# Patient Record
Sex: FEMALE | Race: WHITE | NOT HISPANIC OR LATINO | URBAN - METROPOLITAN AREA
[De-identification: names, ages, dates, MRNs, and addresses within clinical notes are randomized per-mention and may not be internally consistent; named-entity substitution may affect disease eponyms.]

---

## 2023-06-30 ENCOUNTER — APPOINTMENT (OUTPATIENT)
Dept: PRIMARY CARE | Facility: CLINIC | Age: 68
End: 2023-06-30
Payer: COMMERCIAL

## 2023-07-28 ENCOUNTER — APPOINTMENT (OUTPATIENT)
Dept: PRIMARY CARE | Facility: CLINIC | Age: 68
End: 2023-07-28
Payer: COMMERCIAL

## 2023-11-09 PROBLEM — S68.119A FINGER AMPUTATION, TRAUMATIC: Status: ACTIVE | Noted: 2023-11-09

## 2023-11-09 PROBLEM — S92.325A CLOSED NONDISPLACED FRACTURE OF SECOND METATARSAL BONE OF LEFT FOOT: Status: ACTIVE | Noted: 2023-11-09

## 2023-11-09 PROBLEM — H31.019: Status: ACTIVE | Noted: 2023-11-09

## 2023-11-09 PROBLEM — S92.215D: Status: ACTIVE | Noted: 2023-11-09

## 2023-11-09 RX ORDER — TRAMADOL HYDROCHLORIDE 50 MG/1
1 TABLET ORAL 3 TIMES DAILY PRN
COMMUNITY
Start: 2021-05-20

## 2023-11-10 ENCOUNTER — OFFICE VISIT (OUTPATIENT)
Dept: OPHTHALMOLOGY | Facility: CLINIC | Age: 68
End: 2023-11-10
Payer: COMMERCIAL

## 2023-11-10 DIAGNOSIS — H35.3221 EXUDATIVE AGE-RELATED MACULAR DEGENERATION OF LEFT EYE WITH ACTIVE CHOROIDAL NEOVASCULARIZATION (MULTI): ICD-10-CM

## 2023-11-10 DIAGNOSIS — H35.3231 EXUDATIVE AGE-RELATED MACULAR DEGENERATION OF BOTH EYES WITH ACTIVE CHOROIDAL NEOVASCULARIZATION (MULTI): Primary | ICD-10-CM

## 2023-11-10 PROCEDURE — 99214 OFFICE O/P EST MOD 30 MIN: CPT | Performed by: OPHTHALMOLOGY

## 2023-11-10 PROCEDURE — 92134 CPTRZ OPH DX IMG PST SGM RTA: CPT | Mod: BILATERAL PROCEDURE | Performed by: OPHTHALMOLOGY

## 2023-11-10 PROCEDURE — 67028 INJECTION EYE DRUG: CPT | Mod: LEFT SIDE | Performed by: OPHTHALMOLOGY

## 2023-11-10 ASSESSMENT — TONOMETRY
OD_IOP_MMHG: 14
IOP_METHOD: GOLDMANN APPLANATION
OS_IOP_MMHG: 14

## 2023-11-10 ASSESSMENT — VISUAL ACUITY
METHOD: SNELLEN - LINEAR
OS_SC: 20/60-2
OD_SC: 20/50

## 2023-11-10 NOTE — PROGRESS NOTES
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          Discussion        Hi quality OCT  scans obtained  signal good    OCT OD - Normal Foveal Contour, No Edema, IS/OS Junction Normal  OCT OS - Abnormal Foveal Contour,  outer rip RPE No Edema, IS/OS Junction abnormal    additional commnents: created by:Papi Yepez    she  will  need Fa and anti VEGF  Avastin or Eylea OS    i suspect this a hemorrhagic PED that ripped OS    This pt has received injection OS and has not reported seeing better  she i mikee for scond opinion    clealry this i s not CSCR  (outside diagnosis )       Attempt anti VEGF left eye (OS) x1      Plan    Treat left eye (OS)   Treatment options for CNV OS discussed, including observation, anti-VEGF injections (including Avastin, Lucentis,   Eylea and  Beovu ), and  laser. Recommend anti-VEGF injections. Avastin done OS today in a sterile manner with Betadine 5% for antisepsis

## 2023-12-29 ENCOUNTER — OFFICE VISIT (OUTPATIENT)
Dept: OPHTHALMOLOGY | Facility: CLINIC | Age: 68
End: 2023-12-29
Payer: COMMERCIAL

## 2023-12-29 DIAGNOSIS — H35.3230 BILATERAL EXUDATIVE AGE-RELATED MACULAR DEGENERATION, UNSPECIFIED STAGE (MULTI): ICD-10-CM

## 2023-12-29 DIAGNOSIS — H30.143 ACUTE POSTERIOR MULTIFOCAL PLACOID PIGMENT EPITHELIOPATHY, BILATERAL: ICD-10-CM

## 2023-12-29 DIAGNOSIS — H35.3221 EXUDATIVE AGE-RELATED MACULAR DEGENERATION OF LEFT EYE WITH ACTIVE CHOROIDAL NEOVASCULARIZATION (MULTI): Primary | ICD-10-CM

## 2023-12-29 PROCEDURE — 92134 CPTRZ OPH DX IMG PST SGM RTA: CPT | Mod: BILATERAL PROCEDURE | Performed by: OPHTHALMOLOGY

## 2023-12-29 PROCEDURE — 67028 INJECTION EYE DRUG: CPT | Mod: LEFT SIDE | Performed by: OPHTHALMOLOGY

## 2023-12-29 ASSESSMENT — TONOMETRY
OS_IOP_MMHG: 22
OD_IOP_MMHG: 21
IOP_METHOD: TONOPEN

## 2023-12-29 ASSESSMENT — SLIT LAMP EXAM - LIDS
COMMENTS: NORMAL
COMMENTS: NORMAL

## 2023-12-29 ASSESSMENT — EXTERNAL EXAM - RIGHT EYE: OD_EXAM: NORMAL

## 2023-12-29 ASSESSMENT — VISUAL ACUITY
OS_SC: 20/60-1
METHOD: SNELLEN - LINEAR
OD_SC: 20/70-2

## 2023-12-29 ASSESSMENT — EXTERNAL EXAM - LEFT EYE: OS_EXAM: NORMAL

## 2024-03-01 ENCOUNTER — OFFICE VISIT (OUTPATIENT)
Dept: OPHTHALMOLOGY | Facility: CLINIC | Age: 69
End: 2024-03-01
Payer: COMMERCIAL

## 2024-03-01 DIAGNOSIS — H35.3211 EXUDATIVE AGE-RELATED MACULAR DEGENERATION OF RIGHT EYE WITH ACTIVE CHOROIDAL NEOVASCULARIZATION (MULTI): ICD-10-CM

## 2024-03-01 DIAGNOSIS — H35.3230 BILATERAL EXUDATIVE AGE-RELATED MACULAR DEGENERATION, UNSPECIFIED STAGE (MULTI): Primary | ICD-10-CM

## 2024-03-01 DIAGNOSIS — H35.3221 EXUDATIVE AGE-RELATED MACULAR DEGENERATION OF LEFT EYE WITH ACTIVE CHOROIDAL NEOVASCULARIZATION (MULTI): ICD-10-CM

## 2024-03-01 DIAGNOSIS — H35.3233: ICD-10-CM

## 2024-03-01 DIAGNOSIS — H31.012: ICD-10-CM

## 2024-03-01 PROCEDURE — 92134 CPTRZ OPH DX IMG PST SGM RTA: CPT | Performed by: OPHTHALMOLOGY

## 2024-03-01 PROCEDURE — 99213 OFFICE O/P EST LOW 20 MIN: CPT | Performed by: OPHTHALMOLOGY

## 2024-03-01 ASSESSMENT — SLIT LAMP EXAM - LIDS
COMMENTS: NORMAL
COMMENTS: NORMAL

## 2024-03-01 ASSESSMENT — EXTERNAL EXAM - RIGHT EYE: OD_EXAM: NORMAL

## 2024-03-01 ASSESSMENT — CONF VISUAL FIELD
OS_SUPERIOR_NASAL_RESTRICTION: 0
OS_INFERIOR_TEMPORAL_RESTRICTION: 0
OD_NORMAL: 1
OS_NORMAL: 1
OD_INFERIOR_NASAL_RESTRICTION: 0
OD_SUPERIOR_NASAL_RESTRICTION: 0
OS_SUPERIOR_TEMPORAL_RESTRICTION: 0
OD_INFERIOR_TEMPORAL_RESTRICTION: 0
OS_INFERIOR_NASAL_RESTRICTION: 0
OD_SUPERIOR_TEMPORAL_RESTRICTION: 0

## 2024-03-01 ASSESSMENT — VISUAL ACUITY
OS_SC: 20/60
OD_SC: 20/70
METHOD: SNELLEN - LINEAR

## 2024-03-01 ASSESSMENT — EXTERNAL EXAM - LEFT EYE: OS_EXAM: NORMAL

## 2024-03-01 ASSESSMENT — TONOMETRY
OS_IOP_MMHG: 22
OD_IOP_MMHG: 21
IOP_METHOD: GOLDMANN APPLANATION

## 2024-03-01 ASSESSMENT — ENCOUNTER SYMPTOMS: EYES NEGATIVE: 1

## 2024-03-01 NOTE — PROGRESS NOTES
Assessment/Plan   Diagnoses and all orders for this visit:  Bilateral exudative age-related macular degeneration, unspecified stage (CMS/HCC)  -     OCT, Retina - OU - Both Eyes  -     Color Fundus Photography - OU - Both Eyes  Exudative age-related macular degeneration of both eyes with inactive scar (CMS/HCC)  -     OCT, Retina - OU - Both Eyes  Exudative age-related macular degeneration of right eye with active choroidal neovascularization (CMS/HCC)  -     Color Fundus Photography - OU - Both Eyes  Fibrovascular macular scar of left eye  Exudative age-related macular degeneration of left eye with active choroidal neovascularization (CMS/HCC)        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          Discussion        Hi quality OCT  scans obtained  signal good    OCT OD - Normal Foveal Contour, No Edema, IS/OS Junction Normal  OCT OS - Abnormal Foveal Contour,  outer rip RPE No Edema, IS/OS Junction abnormal SRF    additional commnents: created by:Papi Yepez    she  will  need Fa and anti VEGF  Avastin or Eylea OS    i suspect this a hemorrhagic PED that ripped OS    This pt has received injection OS and has not reported seeing better  she i zechariah for scsilvia opinion    clealry this i s not CSCR  (outside diagnosis )       Attempt anti VEGF left eye (OS) x multiple failed    Will try Vabysmo left eye 2weks      Plan    Treat left eye (OS)   Treatment options for CNV OS discussed, including observation, anti-VEGF injections (including Avastin, Lucentis,   Eylea and  Beovu ), and  laser. Recommend anti-VEGF injections.  Vabysmo done OS today in a sterile manner with Betadine 5% for antisepsis

## 2024-03-29 ENCOUNTER — OFFICE VISIT (OUTPATIENT)
Dept: OPHTHALMOLOGY | Facility: CLINIC | Age: 69
End: 2024-03-29
Payer: COMMERCIAL

## 2024-03-29 DIAGNOSIS — H35.3231 EXUDATIVE AGE-RELATED MACULAR DEGENERATION OF BOTH EYES WITH ACTIVE CHOROIDAL NEOVASCULARIZATION (MULTI): ICD-10-CM

## 2024-03-29 DIAGNOSIS — H35.3221 EXUDATIVE AGE-RELATED MACULAR DEGENERATION OF LEFT EYE WITH ACTIVE CHOROIDAL NEOVASCULARIZATION (MULTI): ICD-10-CM

## 2024-03-29 DIAGNOSIS — H31.019: Primary | ICD-10-CM

## 2024-03-29 DIAGNOSIS — H53.40 UNSPECIFIED VISUAL FIELD DEFECTS: ICD-10-CM

## 2024-03-29 PROCEDURE — 92134 CPTRZ OPH DX IMG PST SGM RTA: CPT | Performed by: OPHTHALMOLOGY

## 2024-03-29 PROCEDURE — 67028 INJECTION EYE DRUG: CPT | Mod: LEFT SIDE | Performed by: OPHTHALMOLOGY

## 2024-03-29 ASSESSMENT — ENCOUNTER SYMPTOMS: EYES NEGATIVE: 1

## 2024-03-29 ASSESSMENT — VISUAL ACUITY
OD_SC: 20/80
OS_SC: 20/50
METHOD: SNELLEN - LINEAR

## 2024-03-29 ASSESSMENT — TONOMETRY
IOP_METHOD: GOLDMANN APPLANATION
OD_IOP_MMHG: 21
OS_IOP_MMHG: 21

## 2024-03-29 ASSESSMENT — CUP TO DISC RATIO
OD_RATIO: 0.3
OS_RATIO: 0.3

## 2024-03-29 ASSESSMENT — EXTERNAL EXAM - RIGHT EYE: OD_EXAM: NORMAL

## 2024-03-29 ASSESSMENT — SLIT LAMP EXAM - LIDS
COMMENTS: NORMAL
COMMENTS: NORMAL

## 2024-03-29 ASSESSMENT — EXTERNAL EXAM - LEFT EYE: OS_EXAM: NORMAL

## 2024-03-29 NOTE — PROGRESS NOTES
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 Edema, IS/OS Junction Normal  OCT OS - Abnormal Foveal Contour,  outer rip RPE No Edema, IS/OS Junction abnormal SRF    additional commnents: created by:Papi Yepez    she  will  need Fa and anti VEGF  Avastin or Eylea OS    i suspect this a hemorrhagic PED that ripped OS    This pt has received injection OS and has not reported seeing better  she i shere for scond opinion    clealry this i s not CSCR  (outside diagnosis )          Will try Vabysmo today      Assessment/Plan   Diagnoses and all orders for this visit:  Fibrovascular macular scar, unspecified laterality  -     OCT, Retina - OU - Both Eyes  Unspecified visual field defects  -     OCT, Retina - OU - Both Eyes  Exudative age-related macular degeneration of both eyes with active choroidal neovascularization (CMS/HCC)        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 Edema, IS/OS Junction Normal  OCT OS - Abnormal Foveal Contour,   outer rip RPE No Edema, IS/OS Junction abnormal SRF    additional commnents: created by:Papi Yepez    she  will  need Fa and anti VEGF  Avastin or Eylea OS    i suspect this a hemorrhagic PED that ripped OS    This pt has received injection OS and has not reported seeing better  she i zechariah for scond opinion    fredialalan this i s not CSCR  (outside diagnosis )       Attempt anti VEGF left eye (OS) x multiple failed    Will try Vabysmo left eye 2today       Plan    Treat left eye (OS)   Treatment options for CNV OS discussed, including observation, anti-VEGF injections (including Avastin, Lucentis,   Eylea and  Beovu ), and  laser. Recommend anti-VEGF injections.  Vabysmo done OS today in a sterile manner with Betadine 5% for antisepsis  Treat right eye trail anti VEGF questionable fluid right      Plan  May need both eyes in near future     Treat both eyes      Treatment options for CNV OU  discussed, including observation, anti-VEGF injections (including Avastin, Lucentis,   Eylea  Vabysmo and  Beovu ), and  laser. Recommend anti-VEGF injections. Vabysmo  done OU today in a sterile manner with Betadine 5% for antisepsis.     Fu 1m

## 2024-05-10 ENCOUNTER — OFFICE VISIT (OUTPATIENT)
Dept: OPHTHALMOLOGY | Facility: CLINIC | Age: 69
End: 2024-05-10
Payer: COMMERCIAL

## 2024-05-10 DIAGNOSIS — H35.3231 EXUDATIVE AGE-RELATED MACULAR DEGENERATION OF BOTH EYES WITH ACTIVE CHOROIDAL NEOVASCULARIZATION (MULTI): Primary | ICD-10-CM

## 2024-05-10 DIAGNOSIS — H35.3221 EXUDATIVE AGE-RELATED MACULAR DEGENERATION OF LEFT EYE WITH ACTIVE CHOROIDAL NEOVASCULARIZATION (MULTI): ICD-10-CM

## 2024-05-10 PROCEDURE — 92134 CPTRZ OPH DX IMG PST SGM RTA: CPT | Performed by: OPHTHALMOLOGY

## 2024-05-10 PROCEDURE — 67028 INJECTION EYE DRUG: CPT | Mod: LEFT SIDE | Performed by: OPHTHALMOLOGY

## 2024-05-10 ASSESSMENT — ENCOUNTER SYMPTOMS
PSYCHIATRIC NEGATIVE: 0
ENDOCRINE NEGATIVE: 0
MUSCULOSKELETAL NEGATIVE: 0
CONSTITUTIONAL NEGATIVE: 0
CARDIOVASCULAR NEGATIVE: 0
EYES NEGATIVE: 1
GASTROINTESTINAL NEGATIVE: 0
NEUROLOGICAL NEGATIVE: 0
RESPIRATORY NEGATIVE: 0
ALLERGIC/IMMUNOLOGIC NEGATIVE: 0
HEMATOLOGIC/LYMPHATIC NEGATIVE: 0

## 2024-05-10 ASSESSMENT — VISUAL ACUITY
OD_PH_SC: 20/40
OD_PH_SC+: +1
OS_SC: 20/70
METHOD: SNELLEN - LINEAR
OD_SC: 20/150

## 2024-05-10 ASSESSMENT — CONF VISUAL FIELD
OS_INFERIOR_TEMPORAL_RESTRICTION: 0
OS_SUPERIOR_NASAL_RESTRICTION: 0
OD_SUPERIOR_NASAL_RESTRICTION: 0
OS_NORMAL: 1
OD_NORMAL: 1
OD_INFERIOR_NASAL_RESTRICTION: 0
OS_INFERIOR_NASAL_RESTRICTION: 0
OD_INFERIOR_TEMPORAL_RESTRICTION: 0
OD_SUPERIOR_TEMPORAL_RESTRICTION: 0
OS_SUPERIOR_TEMPORAL_RESTRICTION: 0

## 2024-05-10 ASSESSMENT — TONOMETRY
IOP_METHOD: GOLDMANN APPLANATION
OS_IOP_MMHG: 18
OD_IOP_MMHG: 19

## 2024-05-13 ASSESSMENT — SLIT LAMP EXAM - LIDS
COMMENTS: NORMAL
COMMENTS: NORMAL

## 2024-05-13 ASSESSMENT — CUP TO DISC RATIO
OD_RATIO: 0.3
OS_RATIO: 0.3

## 2024-05-13 ASSESSMENT — EXTERNAL EXAM - LEFT EYE: OS_EXAM: NORMAL

## 2024-05-13 ASSESSMENT — EXTERNAL EXAM - RIGHT EYE: OD_EXAM: NORMAL

## 2024-05-13 NOTE — PROGRESS NOTES
Assessment/Plan   Diagnoses and all orders for this visit:  Exudative age-related macular degeneration of both eyes with active choroidal neovascularization (Multi)  -     OCT, Retina - OU - Both Eyes  Exudative age-related macular degeneration of left eye with active choroidal neovascularization (Multi)  -     Intravitreal Injection, Pharmacologic Agent - OS - Left Eye        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 Edema, IS/OS Junction Normal  OCT OS - Abnormal Foveal Contour,  outer rip RPE No Edema, IS/OS Junction abnormal SRF    additional commnents: created by:Papi Yepez    she  will  need Fa and anti VEGF  Avastin or Eylea OS    i suspect this a hemorrhagic PED that ripped OS    This pt has received injection OS and has not reported seeing better  she i shere for scond opinion    vikas this i s not CSCR  (outside diagnosis )          Will try Vabysmo today      Assessment/Plan   Diagnoses and all orders for this visit:  Exudative age-related macular degeneration of both eyes with active choroidal neovascularization (Multi)  -     OCT, Retina - OU - Both Eyes  Exudative age-related macular degeneration of left eye with active choroidal neovascularization (Multi)  -     Intravitreal Injection, Pharmacologic Agent - OS - Left Eye        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 Edema, IS/OS Junction Normal  OCT OS - Abnormal Foveal Contour,  outer rip RPE No Edema, IS/OS Junction abnormal SRF    additional commnents: created by:Papi Yepez    she  will  need Fa and anti VEGF  Avastin or Eylea OS    i suspect this a hemorrhagic PED that ripped OS    This pt has received injection OS and has not reported seeing better       clealry this i s not CSCR  (outside diagnosis )       Attempt anti VEGF left eye (OS) x multiple failed    Will try Vabysmo left eye 2today       Plan    Treat left eye (OS)   Treatment options for CNV OS discussed, including observation, anti-VEGF injections (including Avastin, Lucentis,   Eylea and  Beovu ), and  laser. Recommend anti-VEGF injections.  Vabysmo done OS today in a sterile manner with Betadine 5% for antisepsis  Treat right eye trail anti VEGF questionable fluid right      Plan                    Fu 1m

## 2024-07-19 ENCOUNTER — APPOINTMENT (OUTPATIENT)
Dept: OPHTHALMOLOGY | Facility: CLINIC | Age: 69
End: 2024-07-19
Payer: COMMERCIAL

## 2024-07-26 ENCOUNTER — APPOINTMENT (OUTPATIENT)
Dept: OPHTHALMOLOGY | Facility: CLINIC | Age: 69
End: 2024-07-26
Payer: COMMERCIAL

## 2024-07-26 DIAGNOSIS — H35.3231 EXUDATIVE AGE-RELATED MACULAR DEGENERATION OF BOTH EYES WITH ACTIVE CHOROIDAL NEOVASCULARIZATION (MULTI): Primary | ICD-10-CM

## 2024-07-26 PROCEDURE — 92134 CPTRZ OPH DX IMG PST SGM RTA: CPT | Performed by: OPHTHALMOLOGY

## 2024-07-26 PROCEDURE — 67028 INJECTION EYE DRUG: CPT | Mod: BILATERAL PROCEDURE | Performed by: OPHTHALMOLOGY

## 2024-07-26 ASSESSMENT — CUP TO DISC RATIO
OD_RATIO: 0.3
OS_RATIO: 0.3

## 2024-07-26 ASSESSMENT — VISUAL ACUITY
OS_SC: 20/50+1
OD_SC: 20/200
METHOD: SNELLEN - LINEAR

## 2024-07-26 ASSESSMENT — TONOMETRY
IOP_METHOD: GOLDMANN APPLANATION
OS_IOP_MMHG: 14
OD_IOP_MMHG: 14

## 2024-07-26 ASSESSMENT — SLIT LAMP EXAM - LIDS
COMMENTS: NORMAL
COMMENTS: NORMAL

## 2024-07-26 ASSESSMENT — EXTERNAL EXAM - LEFT EYE: OS_EXAM: NORMAL

## 2024-07-26 ASSESSMENT — EXTERNAL EXAM - RIGHT EYE: OD_EXAM: NORMAL

## 2024-07-26 NOTE — PROGRESS NOTES
Assessment/Plan   Diagnoses and all orders for this visit:  Exudative age-related macular degeneration of both eyes with active choroidal neovascularization (Multi)  -     OCT, Retina - OU - Both Eyes        Assessment/Plan   Diagnoses and all orders for this visit:  Exudative age-related macular degeneration of both eyes with active choroidal neovascularization (Multi)  -     OCT, Retina - OU - Both Eyes        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 Edema, IS/OS Junction Normal  OCT OS - Abnormal Foveal Contour,  outer rip RPE No Edema, IS/OS Junction abnormal SRF    additional commnents: created by:Papi Yepez    she  will  need Fa and anti VEGF  Avastin or Eylea OS    i suspect this a hemorrhagic PED that ripped OS    This pt has received injection OS and has not reported seeing better  she i mikee for scond opinion    clealry this i s not CSCR  (outside diagnosis )          Will try Vabysmo today      Assessment/Plan   Diagnoses and all orders for this visit:  Exudative age-related macular degeneration of both eyes with active choroidal neovascularization (Multi)  -     OCT, Retina - OU - Both Eyes        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  Edema, IS/OS Junction Normal  OCT OS - Abnormal Foveal Contour,  outer rip RPE No Edema, IS/OS Junction abnormal SRF    additional commnents: created by:Papi Yepez    she  will  need Fa and anti VEGF  Avastin or Eylea OS    i suspect this a hemorrhagic PED that ripped OS    This pt has received injection OS and has not reported seeing better       clealry this i s not CSCR  (outside diagnosis )       Attempt anti VEGF left eye (OS) x multiple failed    Will try Vabysmo left eye 2today       Plan  Treatment options for CNV OU  discussed, including observation, anti-VEGF injections (including Avastin, Lucentis,   Eylea  Vabysmo and  Beovu ), and  laser. Recommend anti-VEGF injections. Vabysmo done OU today in a sterile manner with Betadine 5% for antisepsis.        Plan     Fu 1m               Fu 1m

## 2024-09-06 ENCOUNTER — APPOINTMENT (OUTPATIENT)
Dept: OPHTHALMOLOGY | Facility: CLINIC | Age: 69
End: 2024-09-06
Payer: COMMERCIAL

## 2024-09-06 DIAGNOSIS — H35.3231 EXUDATIVE AGE-RELATED MACULAR DEGENERATION OF BOTH EYES WITH ACTIVE CHOROIDAL NEOVASCULARIZATION (MULTI): ICD-10-CM

## 2024-09-06 DIAGNOSIS — H35.3221 EXUDATIVE AGE-RELATED MACULAR DEGENERATION OF LEFT EYE WITH ACTIVE CHOROIDAL NEOVASCULARIZATION (MULTI): Primary | ICD-10-CM

## 2024-09-06 PROCEDURE — 92134 CPTRZ OPH DX IMG PST SGM RTA: CPT | Performed by: OPHTHALMOLOGY

## 2024-09-06 PROCEDURE — 67028 INJECTION EYE DRUG: CPT | Mod: BILATERAL PROCEDURE | Performed by: OPHTHALMOLOGY

## 2024-09-06 ASSESSMENT — VISUAL ACUITY
OD_SC+: -1
OD_PH_SC: 20/60
OD_SC: 20/100
OD_PH_SC+: +2
METHOD: SNELLEN - LINEAR
OS_SC: CF@3FT

## 2024-09-06 ASSESSMENT — TONOMETRY
IOP_METHOD: GOLDMANN APPLANATION
OD_IOP_MMHG: 18
OS_IOP_MMHG: 16

## 2024-09-06 ASSESSMENT — ENCOUNTER SYMPTOMS
CONSTITUTIONAL NEGATIVE: 0
RESPIRATORY NEGATIVE: 0
EYES NEGATIVE: 1
HEMATOLOGIC/LYMPHATIC NEGATIVE: 0
GASTROINTESTINAL NEGATIVE: 0
NEUROLOGICAL NEGATIVE: 0
MUSCULOSKELETAL NEGATIVE: 0
PSYCHIATRIC NEGATIVE: 0
ALLERGIC/IMMUNOLOGIC NEGATIVE: 0
CARDIOVASCULAR NEGATIVE: 0
ENDOCRINE NEGATIVE: 0

## 2024-09-06 ASSESSMENT — SLIT LAMP EXAM - LIDS
COMMENTS: NORMAL
COMMENTS: NORMAL

## 2024-09-06 ASSESSMENT — EXTERNAL EXAM - RIGHT EYE: OD_EXAM: NORMAL

## 2024-09-06 ASSESSMENT — CUP TO DISC RATIO
OD_RATIO: 0.3
OS_RATIO: 0.3

## 2024-09-06 ASSESSMENT — EXTERNAL EXAM - LEFT EYE: OS_EXAM: NORMAL

## 2024-09-06 NOTE — PROGRESS NOTES
Assessment/Plan   Diagnoses and all orders for this visit:  Exudative age-related macular degeneration of left eye with active choroidal neovascularization (Multi)  -     Intravitreal Injection, Pharmacologic Agent - OS - Left Eye  Exudative age-related macular degeneration of both eyes with active choroidal neovascularization (Multi)  -     OCT, Retina - OU - Both Eyes  -     Intravitreal Injection, Pharmacologic Agent - OU - Both Eyes        Assessment/Plan   Diagnoses and all orders for this visit:  Exudative age-related macular degeneration of left eye with active choroidal neovascularization (Multi)  -     Intravitreal Injection, Pharmacologic Agent - OS - Left Eye  Exudative age-related macular degeneration of both eyes with active choroidal neovascularization (Multi)  -     OCT, Retina - OU - Both Eyes  -     Intravitreal Injection, Pharmacologic Agent - OU - Both Eyes        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 Edema, IS/OS Junction Normal  OCT OS - Abnormal Foveal Contour,  outer rip RPE No Edema, IS/OS Junction abnormal SRF    additional commnents: created by:Papi bean this i s not CSCR  (outside diagnosis )          Will try Vabysmo today      Assessment/Plan   Diagnoses and all orders for this visit:  Exudative age-related macular degeneration of left eye with active choroidal neovascularization (Multi)  -     Intravitreal Injection, Pharmacologic Agent - OS - Left Eye  Exudative age-related macular degeneration of both eyes with active choroidal neovascularization (Multi)  -     OCT, Retina -  OU - Both Eyes  -     Intravitreal Injection, Pharmacologic Agent - OU - Both Eyes        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 Edema, IS/OS Junction Normal  OCT OS - Abnormal Foveal Contour,  outer rip RPE No Edema, IS/OS Junction abnormal SRF    additional commnents: created by:Papi Yepez    she  will  need Fa and anti VEGF  Avastin or Eylea OS    i suspect this a hemorrhagic PED that ripped OS    This pt has received injection OS and has not reported seeing better       clealry this i s not CSCR  (outside diagnosis )       Attempt anti VEGF left eye (OS) x multiple failed    Will try Vabysmo left eye 2today       Plan  Treatment options for CNV OU  discussed, including observation, anti-VEGF injections (including Avastin, Lucentis,   Eylea  Vabysmo and  Beovu ), and  laser. Recommend anti-VEGF injections. Vabysmo done OU today in a sterile manner with Betadine 5% for antisepsis.        Plan     Fu 1m

## 2024-10-11 ENCOUNTER — APPOINTMENT (OUTPATIENT)
Dept: OPHTHALMOLOGY | Facility: CLINIC | Age: 69
End: 2024-10-11
Payer: COMMERCIAL

## 2024-10-11 DIAGNOSIS — H35.3221 EXUDATIVE AGE-RELATED MACULAR DEGENERATION OF LEFT EYE WITH ACTIVE CHOROIDAL NEOVASCULARIZATION: ICD-10-CM

## 2024-10-11 DIAGNOSIS — H35.3231 EXUDATIVE AGE-RELATED MACULAR DEGENERATION OF BOTH EYES WITH ACTIVE CHOROIDAL NEOVASCULARIZATION: Primary | ICD-10-CM

## 2024-10-11 ASSESSMENT — CUP TO DISC RATIO
OS_RATIO: 0.3
OD_RATIO: 0.3

## 2024-10-11 ASSESSMENT — EXTERNAL EXAM - LEFT EYE: OS_EXAM: NORMAL

## 2024-10-11 ASSESSMENT — TONOMETRY
OS_IOP_MMHG: 17
OD_IOP_MMHG: 17
IOP_METHOD: GOLDMANN APPLANATION

## 2024-10-11 ASSESSMENT — ENCOUNTER SYMPTOMS
CONSTITUTIONAL NEGATIVE: 0
NEUROLOGICAL NEGATIVE: 0
CARDIOVASCULAR NEGATIVE: 0
MUSCULOSKELETAL NEGATIVE: 0
RESPIRATORY NEGATIVE: 0
PSYCHIATRIC NEGATIVE: 0
ENDOCRINE NEGATIVE: 0
ALLERGIC/IMMUNOLOGIC NEGATIVE: 0
GASTROINTESTINAL NEGATIVE: 0
EYES NEGATIVE: 0
HEMATOLOGIC/LYMPHATIC NEGATIVE: 0

## 2024-10-11 ASSESSMENT — CONF VISUAL FIELD
OS_NORMAL: 1
OD_NORMAL: 1
OD_SUPERIOR_NASAL_RESTRICTION: 0
OS_SUPERIOR_TEMPORAL_RESTRICTION: 0
OD_INFERIOR_NASAL_RESTRICTION: 0
OS_INFERIOR_NASAL_RESTRICTION: 0
OD_SUPERIOR_TEMPORAL_RESTRICTION: 0
OD_INFERIOR_TEMPORAL_RESTRICTION: 0
OS_INFERIOR_TEMPORAL_RESTRICTION: 0
OS_SUPERIOR_NASAL_RESTRICTION: 0

## 2024-10-11 ASSESSMENT — VISUAL ACUITY
OD_SC: 20/150
OD_PH_SC+: +2
OD_PH_SC: 20/60
OS_SC: CF 3'
METHOD: SNELLEN - LINEAR

## 2024-10-11 ASSESSMENT — SLIT LAMP EXAM - LIDS
COMMENTS: NORMAL
COMMENTS: NORMAL

## 2024-10-11 ASSESSMENT — EXTERNAL EXAM - RIGHT EYE: OD_EXAM: NORMAL

## 2024-10-11 NOTE — PROGRESS NOTES
Assessment/Plan   Diagnoses and all orders for this visit:  Exudative age-related macular degeneration of both eyes with active choroidal neovascularization  -     OCT, Retina - OU - Both Eyes  Exudative age-related macular degeneration of left eye with active choroidal neovascularization  -     Intravitreal Injection, Pharmacologic Agent - OS - Left Eye        Assessment/Plan   Diagnoses and all orders for this visit:  Exudative age-related macular degeneration of both eyes with active choroidal neovascularization  -     OCT, Retina - OU - Both Eyes  Exudative age-related macular degeneration of left eye with active choroidal neovascularization  -     Intravitreal Injection, Pharmacologic Agent - OS - Left Eye        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 Edema, IS/OS Junction Normal  OCT OS - Abnormal Foveal Contour,  outer rip RPE No Edema, IS/OS Junction abnormal SRF    additional commnents: created by:Papi bean this i s not CSCR  (outside diagnosis )          Will try Vabysmo today      Assessment/Plan   Diagnoses and all orders for this visit:  Exudative age-related macular degeneration of both eyes with active choroidal neovascularization  -     OCT, Retina - OU - Both Eyes  Exudative age-related macular degeneration of left eye with active choroidal neovascularization  -     Intravitreal Injection, Pharmacologic Agent - OS - Left Eye        Impression    1 H35.3211 Exudative age-related macular degeneration, right eye, with active choroidal neovascularization-New  2 H35.3221 Exudative age-related  macular degeneration, left eye, with active choroidal neovascularization-New  3 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 Edema, IS/OS Junction Normal  OCT OS - Abnormal Foveal Contour,  outer rip RPE No Edema, IS/OS Junction abnormal SRF    additional commnents: created by:Papi Yepez    she  will  need Fa and anti VEGF  Avastin or Eylea OS    i suspect this a hemorrhagic PED that ripped OS    This pt has received injection OS and has not reported seeing better       clealry this i s not CSCR  (outside diagnosis )       Attempt anti VEGF left eye (OS) x multiple failed    Will try Vabysmo left eye 2today       Plan  Treatment options for CNV OU  discussed, including observation, anti-VEGF injections (including Avastin, Lucentis,   Eylea  Vabysmo and  Beovu ), and  laser. Recommend anti-VEGF injections. Vabysmo done OU today in a sterile manner with Betadine 5% for antisepsis.        Plan     Fu 1m               inj

## 2024-11-20 ENCOUNTER — APPOINTMENT (OUTPATIENT)
Dept: OPHTHALMOLOGY | Facility: CLINIC | Age: 69
End: 2024-11-20
Payer: COMMERCIAL

## 2024-11-20 DIAGNOSIS — H35.3231 EXUDATIVE AGE-RELATED MACULAR DEGENERATION OF BOTH EYES WITH ACTIVE CHOROIDAL NEOVASCULARIZATION: Primary | ICD-10-CM

## 2024-11-20 DIAGNOSIS — H35.3221 EXUDATIVE AGE-RELATED MACULAR DEGENERATION OF LEFT EYE WITH ACTIVE CHOROIDAL NEOVASCULARIZATION: ICD-10-CM

## 2024-11-20 PROCEDURE — 67028 INJECTION EYE DRUG: CPT | Mod: LEFT SIDE | Performed by: OPHTHALMOLOGY

## 2024-11-20 PROCEDURE — 92134 CPTRZ OPH DX IMG PST SGM RTA: CPT | Performed by: OPHTHALMOLOGY

## 2024-11-20 ASSESSMENT — VISUAL ACUITY
OS_SC: HM
OD_PH_SC: 20/60
METHOD: SNELLEN - LINEAR
OD_SC: 20/200

## 2024-11-20 ASSESSMENT — SLIT LAMP EXAM - LIDS
COMMENTS: NORMAL
COMMENTS: NORMAL

## 2024-11-20 ASSESSMENT — EXTERNAL EXAM - LEFT EYE: OS_EXAM: NORMAL

## 2024-11-20 ASSESSMENT — CUP TO DISC RATIO
OS_RATIO: 0.3
OD_RATIO: 0.3

## 2024-11-20 ASSESSMENT — EXTERNAL EXAM - RIGHT EYE: OD_EXAM: NORMAL

## 2024-11-20 ASSESSMENT — TONOMETRY
OS_IOP_MMHG: 18
IOP_METHOD: GOLDMANN APPLANATION
OD_IOP_MMHG: 18

## 2024-11-20 NOTE — PROGRESS NOTES
Assessment/Plan   Diagnoses and all orders for this visit:  Exudative age-related macular degeneration of both eyes with active choroidal neovascularization  -     OCT, Retina - OU - Both Eyes        Assessment/Plan   Diagnoses and all orders for this visit:  Exudative age-related macular degeneration of both eyes with active choroidal neovascularization  -     OCT, Retina - OU - Both Eyes        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 Edema, IS/OS Junction Normal  OCT OS - Abnormal Foveal Contour,  outer rip RPE No Edema, IS/OS Junction abnormal SRF    additional commnents: created by:Papi bean this i s not CSCR  (outside diagnosis )          Will try Vabysmo today      Assessment/Plan   Diagnoses and all orders for this visit:  Exudative age-related macular degeneration of both eyes with active choroidal neovascularization  -     OCT, Retina - OU - Both Eyes        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 Edema, IS/OS Junction Normal  OCT OS - Abnormal Foveal Contour,  outer rip RPE No Edema, IS/OS Junction abnormal SRF    additional commnents: created by:Papi Yepez i  suspect this a hemorrhagic PED that ripped OS    This pt has received injection OS and has not reported seeing better       clealry this i s not CSCR  (outside diagnosis )       Attempt anti VEGF left eye (OS) x multiple failed    Will try Vabysmo left eye 2today       Plan  Treatment options for CNV OS discussed, including observation, anti-VEGF injections (including Avastin, Lucentis,   Eylea, Vabysmo and  Beovu ), and  laser. Recommend anti-VEGF injections. Vabysmo done OS today in a sterile manner with Betadine 5% for antisepsis        Plan     Fu 1m               inj

## 2024-11-22 ENCOUNTER — APPOINTMENT (OUTPATIENT)
Dept: OPHTHALMOLOGY | Facility: CLINIC | Age: 69
End: 2024-11-22
Payer: COMMERCIAL

## 2024-12-05 ENCOUNTER — APPOINTMENT (OUTPATIENT)
Dept: OPHTHALMOLOGY | Facility: CLINIC | Age: 69
End: 2024-12-05
Payer: COMMERCIAL

## 2024-12-20 ENCOUNTER — APPOINTMENT (OUTPATIENT)
Dept: OPHTHALMOLOGY | Facility: CLINIC | Age: 69
End: 2024-12-20
Payer: COMMERCIAL

## 2024-12-20 DIAGNOSIS — H35.3211 EXUDATIVE AGE-RELATED MACULAR DEGENERATION OF RIGHT EYE WITH ACTIVE CHOROIDAL NEOVASCULARIZATION: ICD-10-CM

## 2024-12-20 DIAGNOSIS — H35.3221 EXUDATIVE AGE-RELATED MACULAR DEGENERATION OF LEFT EYE WITH ACTIVE CHOROIDAL NEOVASCULARIZATION: ICD-10-CM

## 2024-12-20 DIAGNOSIS — H35.3231 EXUDATIVE AGE-RELATED MACULAR DEGENERATION OF BOTH EYES WITH ACTIVE CHOROIDAL NEOVASCULARIZATION: Primary | ICD-10-CM

## 2024-12-20 ASSESSMENT — VISUAL ACUITY
OS_SC: HM
METHOD: SNELLEN - LINEAR
OD_SC: 20/150

## 2024-12-20 ASSESSMENT — TONOMETRY
OD_IOP_MMHG: 18
IOP_METHOD: GOLDMANN APPLANATION
OS_IOP_MMHG: 17

## 2024-12-20 ASSESSMENT — ENCOUNTER SYMPTOMS
ENDOCRINE NEGATIVE: 0
GASTROINTESTINAL NEGATIVE: 0
EYES NEGATIVE: 0
MUSCULOSKELETAL NEGATIVE: 0
CONSTITUTIONAL NEGATIVE: 0
HEMATOLOGIC/LYMPHATIC NEGATIVE: 0
PSYCHIATRIC NEGATIVE: 0
CARDIOVASCULAR NEGATIVE: 0
RESPIRATORY NEGATIVE: 0
ALLERGIC/IMMUNOLOGIC NEGATIVE: 0
NEUROLOGICAL NEGATIVE: 0

## 2024-12-20 NOTE — PROGRESS NOTES
Assessment/Plan   Diagnoses and all orders for this visit:  Exudative age-related macular degeneration of both eyes with active choroidal neovascularization  -     OCT, Retina - OU - Both Eyes  Exudative age-related macular degeneration of right eye with active choroidal neovascularization  Exudative age-related macular degeneration of left eye with active choroidal neovascularization  -     Intravitreal Injection, Pharmacologic Agent - OS - Left Eye        Assessment/Plan   Diagnoses and all orders for this visit:  Exudative age-related macular degeneration of both eyes with active choroidal neovascularization  -     OCT, Retina - OU - Both Eyes  Exudative age-related macular degeneration of right eye with active choroidal neovascularization  Exudative age-related macular degeneration of left eye with active choroidal neovascularization  -     Intravitreal Injection, Pharmacologic Agent - OS - Left Eye        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 Edema, IS/OS Junction Normal  OCT OS - Abnormal Foveal Contour,  outer rip RPE No Edema, IS/OS Junction abnormal SRF    additional commnents: created by:Papi bean this i s not CSCR  (outside diagnosis )          Will try Vabysmo today      Assessment/Plan   Diagnoses and all orders for this visit:  Exudative age-related macular degeneration of both eyes with active choroidal neovascularization  -     OCT, Retina - OU - Both Eyes  Exudative age-related macular degeneration of right eye with active choroidal  neovascularization  Exudative age-related macular degeneration of left eye with active choroidal neovascularization  -     Intravitreal Injection, Pharmacologic Agent - OS - Left Eye        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 Recommend anti-VEGF injections. Vabysmo done OS today in a sterile manner with Betadine 5% for antisepsis        Plan     Fu 2m               inj

## 2025-01-31 ENCOUNTER — DOCUMENTATION (OUTPATIENT)
Dept: OPHTHALMOLOGY | Facility: CLINIC | Age: 70
End: 2025-01-31

## 2025-01-31 ENCOUNTER — APPOINTMENT (OUTPATIENT)
Dept: OPHTHALMOLOGY | Facility: CLINIC | Age: 70
End: 2025-01-31
Payer: COMMERCIAL

## 2025-01-31 DIAGNOSIS — H35.3221 EXUDATIVE AGE-RELATED MACULAR DEGENERATION OF LEFT EYE WITH ACTIVE CHOROIDAL NEOVASCULARIZATION: ICD-10-CM

## 2025-01-31 DIAGNOSIS — H35.3211 EXUDATIVE AGE-RELATED MACULAR DEGENERATION OF RIGHT EYE WITH ACTIVE CHOROIDAL NEOVASCULARIZATION: ICD-10-CM

## 2025-01-31 DIAGNOSIS — H35.3231 EXUDATIVE AGE-RELATED MACULAR DEGENERATION OF BOTH EYES WITH ACTIVE CHOROIDAL NEOVASCULARIZATION: Primary | ICD-10-CM

## 2025-01-31 PROCEDURE — 1159F MED LIST DOCD IN RCRD: CPT | Performed by: OPHTHALMOLOGY

## 2025-01-31 PROCEDURE — 92134 CPTRZ OPH DX IMG PST SGM RTA: CPT | Performed by: OPHTHALMOLOGY

## 2025-01-31 ASSESSMENT — TONOMETRY
OS_IOP_MMHG: 18
IOP_METHOD: GOLDMANN APPLANATION
OD_IOP_MMHG: 18

## 2025-01-31 ASSESSMENT — CUP TO DISC RATIO
OD_RATIO: 0.3
OS_RATIO: 0.3

## 2025-01-31 ASSESSMENT — CONF VISUAL FIELD
OS_INFERIOR_NASAL_RESTRICTION: 2
OD_SUPERIOR_TEMPORAL_RESTRICTION: 0
OD_NORMAL: 1
OS_SUPERIOR_TEMPORAL_RESTRICTION: 2
OS_INFERIOR_TEMPORAL_RESTRICTION: 2
OS_SUPERIOR_NASAL_RESTRICTION: 2
OD_SUPERIOR_NASAL_RESTRICTION: 0
OD_INFERIOR_TEMPORAL_RESTRICTION: 0
OD_INFERIOR_NASAL_RESTRICTION: 0

## 2025-01-31 ASSESSMENT — VISUAL ACUITY
OD_PH_SC: 20/100
OD_SC: 20/300
METHOD: SNELLEN - LINEAR

## 2025-01-31 ASSESSMENT — EXTERNAL EXAM - RIGHT EYE: OD_EXAM: NORMAL

## 2025-01-31 ASSESSMENT — SLIT LAMP EXAM - LIDS
COMMENTS: NORMAL
COMMENTS: NORMAL

## 2025-01-31 ASSESSMENT — ENCOUNTER SYMPTOMS: EYES NEGATIVE: 1

## 2025-01-31 ASSESSMENT — EXTERNAL EXAM - LEFT EYE: OS_EXAM: NORMAL

## 2025-01-31 NOTE — PROGRESS NOTES
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 Edema, IS/OS Junction Normal  OCT OS - Abnormal Foveal Contour,  outer rip RPE No Edema, IS/OS Junction abnormal SRF    additional commnents: created by:Papi bean this i s not CSCR  (outside diagnosis )          Will try Vabysmo today      Assessment/Plan   Diagnoses and all orders for this visit:  Exudative age-related macular degeneration of both eyes with active choroidal neovascularization  -     OCT, Retina - OU - Both Eyes        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 Recommend anti-VEGF injections. Vabysmo done OS today in a sterile manner with Betadine 5% for antisepsis        Plan     Fu 2m               inj

## 2025-04-04 ENCOUNTER — APPOINTMENT (OUTPATIENT)
Dept: OPHTHALMOLOGY | Facility: CLINIC | Age: 70
End: 2025-04-04
Payer: COMMERCIAL

## 2025-04-04 DIAGNOSIS — H35.3231 EXUDATIVE AGE-RELATED MACULAR DEGENERATION OF BOTH EYES WITH ACTIVE CHOROIDAL NEOVASCULARIZATION: Primary | ICD-10-CM

## 2025-04-04 PROCEDURE — 67028 INJECTION EYE DRUG: CPT | Mod: BILATERAL PROCEDURE | Performed by: OPHTHALMOLOGY

## 2025-04-04 PROCEDURE — 92134 CPTRZ OPH DX IMG PST SGM RTA: CPT | Performed by: OPHTHALMOLOGY

## 2025-04-04 ASSESSMENT — CUP TO DISC RATIO
OS_RATIO: 0.3
OD_RATIO: 0.3

## 2025-04-04 ASSESSMENT — ENCOUNTER SYMPTOMS
RESPIRATORY NEGATIVE: 0
ENDOCRINE NEGATIVE: 0
ALLERGIC/IMMUNOLOGIC NEGATIVE: 0
EYES NEGATIVE: 1
CONSTITUTIONAL NEGATIVE: 0
HEMATOLOGIC/LYMPHATIC NEGATIVE: 0
CARDIOVASCULAR NEGATIVE: 0
NEUROLOGICAL NEGATIVE: 0
GASTROINTESTINAL NEGATIVE: 0
MUSCULOSKELETAL NEGATIVE: 0
PSYCHIATRIC NEGATIVE: 0

## 2025-04-04 ASSESSMENT — TONOMETRY
OD_IOP_MMHG: 20
IOP_METHOD: TONOPEN
OS_IOP_MMHG: 21

## 2025-04-04 ASSESSMENT — CONF VISUAL FIELD
OS_INFERIOR_NASAL_RESTRICTION: 0
OS_SUPERIOR_TEMPORAL_RESTRICTION: 0
OS_NORMAL: 1
OS_SUPERIOR_NASAL_RESTRICTION: 0
OS_INFERIOR_TEMPORAL_RESTRICTION: 0

## 2025-04-04 ASSESSMENT — VISUAL ACUITY
OD_PH_SC: 20/80
OD_SC: 20/200
METHOD: SNELLEN - LINEAR
OS_SC: 20/200

## 2025-04-04 ASSESSMENT — SLIT LAMP EXAM - LIDS
COMMENTS: NORMAL
COMMENTS: NORMAL

## 2025-04-04 ASSESSMENT — EXTERNAL EXAM - LEFT EYE: OS_EXAM: NORMAL

## 2025-04-04 ASSESSMENT — EXTERNAL EXAM - RIGHT EYE: OD_EXAM: NORMAL

## 2025-04-04 NOTE — PROGRESS NOTES
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 Edema, IS/OS Junction Normal  OCT OS - Abnormal Foveal Contour,  outer rip RPE No Edema, IS/OS Junction abnormal SRF    additional commnents: created by:Papi Yepez              clemeeta this i s not CSCR  (outside diagnosis )          Will try Vabysmo today OU               Discussion        Hi quality OCT  scans obtained  signal good    OCT OD - Normal Foveal Contour, ? 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 Recommend anti-VEGF injections. Vabysmo done OU today in a sterile manner with Betadine 5% for antisepsis.          Plan     Fu 2m               inj

## 2025-05-01 ENCOUNTER — APPOINTMENT (OUTPATIENT)
Dept: OPHTHALMOLOGY | Facility: CLINIC | Age: 70
End: 2025-05-01
Payer: COMMERCIAL

## 2025-05-01 DIAGNOSIS — H52.4 MYOPIA OF BOTH EYES WITH REGULAR ASTIGMATISM AND PRESBYOPIA: ICD-10-CM

## 2025-05-01 DIAGNOSIS — H52.223 MYOPIA OF BOTH EYES WITH REGULAR ASTIGMATISM AND PRESBYOPIA: ICD-10-CM

## 2025-05-01 DIAGNOSIS — H35.3231 EXUDATIVE AGE-RELATED MACULAR DEGENERATION OF BOTH EYES WITH ACTIVE CHOROIDAL NEOVASCULARIZATION: ICD-10-CM

## 2025-05-01 DIAGNOSIS — H25.13 AGE-RELATED NUCLEAR CATARACT OF BOTH EYES: Primary | ICD-10-CM

## 2025-05-01 DIAGNOSIS — H52.13 MYOPIA OF BOTH EYES WITH REGULAR ASTIGMATISM AND PRESBYOPIA: ICD-10-CM

## 2025-05-01 DIAGNOSIS — H31.012: ICD-10-CM

## 2025-05-01 PROCEDURE — 92012 INTRM OPH EXAM EST PATIENT: CPT | Performed by: STUDENT IN AN ORGANIZED HEALTH CARE EDUCATION/TRAINING PROGRAM

## 2025-05-01 PROCEDURE — 92015 DETERMINE REFRACTIVE STATE: CPT | Performed by: STUDENT IN AN ORGANIZED HEALTH CARE EDUCATION/TRAINING PROGRAM

## 2025-05-01 ASSESSMENT — REFRACTION_MANIFEST
OS_SPHERE: -1.25
OD_ADD: +2.50
OS_AXIS: 090
OS_CYLINDER: -1.75
OD_AXIS: 095
OD_SPHERE: -1.75
OS_ADD: +2.50
OD_CYLINDER: -2.25

## 2025-05-01 ASSESSMENT — CONF VISUAL FIELD
OD_SUPERIOR_NASAL_RESTRICTION: 0
OS_SUPERIOR_NASAL_RESTRICTION: 0
OS_SUPERIOR_TEMPORAL_RESTRICTION: 0
OS_INFERIOR_TEMPORAL_RESTRICTION: 0
OD_INFERIOR_NASAL_RESTRICTION: 0
OS_NORMAL: 1
OD_NORMAL: 1
OD_INFERIOR_TEMPORAL_RESTRICTION: 0
OD_SUPERIOR_TEMPORAL_RESTRICTION: 0
OS_INFERIOR_NASAL_RESTRICTION: 0
METHOD: COUNTING FINGERS

## 2025-05-01 ASSESSMENT — CUP TO DISC RATIO
OS_RATIO: 0.3
OD_RATIO: 0.3

## 2025-05-01 ASSESSMENT — VISUAL ACUITY
OD_PH_SC: 20/50
OS_SC: HM
OD_SC: 20/200
METHOD: SNELLEN - LINEAR
OD_PH_SC+: +1

## 2025-05-01 ASSESSMENT — ENCOUNTER SYMPTOMS
ALLERGIC/IMMUNOLOGIC NEGATIVE: 0
CONSTITUTIONAL NEGATIVE: 0
PSYCHIATRIC NEGATIVE: 0
CARDIOVASCULAR NEGATIVE: 0
RESPIRATORY NEGATIVE: 0
GASTROINTESTINAL NEGATIVE: 0
NEUROLOGICAL NEGATIVE: 0
EYES NEGATIVE: 1
ENDOCRINE NEGATIVE: 0
MUSCULOSKELETAL NEGATIVE: 0
HEMATOLOGIC/LYMPHATIC NEGATIVE: 0

## 2025-05-01 ASSESSMENT — SLIT LAMP EXAM - LIDS
COMMENTS: NORMAL, MILD MGD
COMMENTS: NORMAL, MILD MGD

## 2025-05-01 ASSESSMENT — EXTERNAL EXAM - RIGHT EYE: OD_EXAM: NORMAL

## 2025-05-01 ASSESSMENT — TONOMETRY
OD_IOP_MMHG: 18
OS_IOP_MMHG: 19
IOP_METHOD: TONOPEN

## 2025-05-01 ASSESSMENT — EXTERNAL EXAM - LEFT EYE: OS_EXAM: NORMAL

## 2025-05-01 NOTE — PROGRESS NOTES
Assessment/Plan   Diagnoses and all orders for this visit:  Age-related nuclear cataract of both eyes  Exudative age-related macular degeneration of both eyes with active choroidal neovascularization  Fibrovascular macular scar of left eye  Myopia of both eyes with regular astigmatism and presbyopia  -patient here today for a vision exam being monitored by Dr. Yepez for active CNVM OU  -vision is limited by posterior retinal findings   -monocular precautions discussed with FTW and polycarbonate lenses  -BCVA today OD 20/40- OS HM central  -discussed today patient has mild cataracts but vision is limited by macular degeneration    RTC with retina as scheduled 6/2025

## 2025-06-27 ENCOUNTER — APPOINTMENT (OUTPATIENT)
Dept: OPHTHALMOLOGY | Facility: CLINIC | Age: 70
End: 2025-06-27
Payer: COMMERCIAL

## 2025-06-27 DIAGNOSIS — H35.3221 EXUDATIVE AGE-RELATED MACULAR DEGENERATION OF LEFT EYE WITH ACTIVE CHOROIDAL NEOVASCULARIZATION: ICD-10-CM

## 2025-06-27 DIAGNOSIS — H35.3231 EXUDATIVE AGE-RELATED MACULAR DEGENERATION OF BOTH EYES WITH ACTIVE CHOROIDAL NEOVASCULARIZATION: ICD-10-CM

## 2025-06-27 DIAGNOSIS — H25.13 AGE-RELATED NUCLEAR CATARACT OF BOTH EYES: Primary | ICD-10-CM

## 2025-06-27 DIAGNOSIS — H31.012: ICD-10-CM

## 2025-06-27 PROCEDURE — 1159F MED LIST DOCD IN RCRD: CPT | Performed by: OPHTHALMOLOGY

## 2025-06-27 PROCEDURE — 67028 INJECTION EYE DRUG: CPT | Mod: BILATERAL PROCEDURE | Performed by: OPHTHALMOLOGY

## 2025-06-27 PROCEDURE — 92134 CPTRZ OPH DX IMG PST SGM RTA: CPT | Performed by: OPHTHALMOLOGY

## 2025-06-27 ASSESSMENT — CONF VISUAL FIELD
OS_SUPERIOR_TEMPORAL_RESTRICTION: 0
OD_INFERIOR_TEMPORAL_RESTRICTION: 0
OS_NORMAL: 1
OS_SUPERIOR_NASAL_RESTRICTION: 0
OS_INFERIOR_TEMPORAL_RESTRICTION: 0
METHOD: COUNTING FINGERS
OD_SUPERIOR_NASAL_RESTRICTION: 0
OD_INFERIOR_NASAL_RESTRICTION: 0
OD_SUPERIOR_TEMPORAL_RESTRICTION: 0
OS_INFERIOR_NASAL_RESTRICTION: 0
OD_NORMAL: 1

## 2025-06-27 ASSESSMENT — VISUAL ACUITY
OS_SC: 20/300
METHOD: SNELLEN - LINEAR
OD_PH_CC: 20/50
OD_PH_CC+: +1
OD_CC: 20/50

## 2025-06-27 ASSESSMENT — CUP TO DISC RATIO
OD_RATIO: 0.3
OS_RATIO: 0.3

## 2025-06-27 ASSESSMENT — TONOMETRY
IOP_METHOD: TONOPEN
OD_IOP_MMHG: 18
OS_IOP_MMHG: 19

## 2025-06-27 ASSESSMENT — SLIT LAMP EXAM - LIDS
COMMENTS: NORMAL
COMMENTS: NORMAL

## 2025-06-27 ASSESSMENT — EXTERNAL EXAM - RIGHT EYE: OD_EXAM: NORMAL

## 2025-06-27 ASSESSMENT — EXTERNAL EXAM - LEFT EYE: OS_EXAM: NORMAL

## 2025-06-27 NOTE — PROGRESS NOTES
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 Edema, IS/OS Junction Normal  OCT OS - Abnormal Foveal Contour,  outer rip RPE No Edema, IS/OS Junction abnormal SRF    additional commnents: created by:Papi Yepez      clealry this i s not CSCR  (outside diagnosis )                   Discussion        Hi quality OCT  scans obtained  signal good    OCT OD - Normal Foveal Contour, ? 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 Recommend anti-VEGF injections. Vabysmo done OU today in a sterile manner with Betadine 5% for antisepsis.  Vabysmo OU        Plan     Fu 2m              Obtain approval vabusmo ou  Has been effective

## 2025-08-29 ENCOUNTER — APPOINTMENT (OUTPATIENT)
Dept: OPHTHALMOLOGY | Facility: CLINIC | Age: 70
End: 2025-08-29
Payer: COMMERCIAL

## 2025-08-29 DIAGNOSIS — H30.143 ACUTE POSTERIOR MULTIFOCAL PLACOID PIGMENT EPITHELIOPATHY, BILATERAL: ICD-10-CM

## 2025-08-29 DIAGNOSIS — H35.3231 EXUDATIVE AGE-RELATED MACULAR DEGENERATION OF BOTH EYES WITH ACTIVE CHOROIDAL NEOVASCULARIZATION: Primary | ICD-10-CM

## 2025-08-29 DIAGNOSIS — H25.13 AGE-RELATED NUCLEAR CATARACT OF BOTH EYES: ICD-10-CM

## 2025-08-29 PROCEDURE — 99213 OFFICE O/P EST LOW 20 MIN: CPT | Performed by: OPHTHALMOLOGY

## 2025-08-29 PROCEDURE — 92134 CPTRZ OPH DX IMG PST SGM RTA: CPT | Performed by: OPHTHALMOLOGY

## 2025-08-29 ASSESSMENT — CONF VISUAL FIELD
OS_NORMAL: 1
OS_INFERIOR_NASAL_RESTRICTION: 0
OD_SUPERIOR_NASAL_RESTRICTION: 0
OD_NORMAL: 1
OD_INFERIOR_TEMPORAL_RESTRICTION: 0
METHOD: COUNTING FINGERS
OS_SUPERIOR_NASAL_RESTRICTION: 0
OD_INFERIOR_NASAL_RESTRICTION: 0
OD_SUPERIOR_TEMPORAL_RESTRICTION: 0
OS_SUPERIOR_TEMPORAL_RESTRICTION: 0
OS_INFERIOR_TEMPORAL_RESTRICTION: 0

## 2025-08-29 ASSESSMENT — ENCOUNTER SYMPTOMS
GASTROINTESTINAL NEGATIVE: 0
ALLERGIC/IMMUNOLOGIC NEGATIVE: 0
CONSTITUTIONAL NEGATIVE: 0
MUSCULOSKELETAL NEGATIVE: 0
EYES NEGATIVE: 1
ENDOCRINE NEGATIVE: 0
CARDIOVASCULAR NEGATIVE: 0
RESPIRATORY NEGATIVE: 0
PSYCHIATRIC NEGATIVE: 0
HEMATOLOGIC/LYMPHATIC NEGATIVE: 0
NEUROLOGICAL NEGATIVE: 0

## 2025-08-29 ASSESSMENT — VISUAL ACUITY
METHOD: SNELLEN - LINEAR
OD_PH_SC: 20/50
OS_SC: CF @2FT
OD_SC: 20/300

## 2025-08-29 ASSESSMENT — SLIT LAMP EXAM - LIDS
COMMENTS: NORMAL
COMMENTS: NORMAL

## 2025-08-29 ASSESSMENT — TONOMETRY
IOP_METHOD: TONOPEN
OS_IOP_MMHG: 18
OD_IOP_MMHG: 17

## 2025-08-29 ASSESSMENT — CUP TO DISC RATIO
OS_RATIO: 0.3
OD_RATIO: 0.3

## 2025-08-29 ASSESSMENT — EXTERNAL EXAM - RIGHT EYE: OD_EXAM: NORMAL

## 2025-08-29 ASSESSMENT — EXTERNAL EXAM - LEFT EYE: OS_EXAM: NORMAL

## 2025-10-24 ENCOUNTER — APPOINTMENT (OUTPATIENT)
Dept: OPHTHALMOLOGY | Facility: CLINIC | Age: 70
End: 2025-10-24
Payer: COMMERCIAL